# Patient Record
Sex: MALE | Race: BLACK OR AFRICAN AMERICAN | NOT HISPANIC OR LATINO | URBAN - METROPOLITAN AREA
[De-identification: names, ages, dates, MRNs, and addresses within clinical notes are randomized per-mention and may not be internally consistent; named-entity substitution may affect disease eponyms.]

---

## 2016-10-11 RX ORDER — DEXAMETHASONE 0.5 MG/5ML
2 ELIXIR ORAL
Qty: 0 | Refills: 0 | COMMUNITY
Start: 2016-10-11

## 2017-02-10 ENCOUNTER — INPATIENT (INPATIENT)
Facility: HOSPITAL | Age: 72
LOS: 3 days | Discharge: LEFT BEFORE DISCHARGE | DRG: 80 | End: 2017-02-14
Attending: INTERNAL MEDICINE | Admitting: INTERNAL MEDICINE
Payer: MEDICARE

## 2017-02-10 VITALS
HEART RATE: 108 BPM | DIASTOLIC BLOOD PRESSURE: 80 MMHG | RESPIRATION RATE: 20 BRPM | OXYGEN SATURATION: 100 % | SYSTOLIC BLOOD PRESSURE: 138 MMHG

## 2017-02-10 DIAGNOSIS — G93.6 CEREBRAL EDEMA: ICD-10-CM

## 2017-02-10 DIAGNOSIS — C64.9 MALIGNANT NEOPLASM OF UNSPECIFIED KIDNEY, EXCEPT RENAL PELVIS: ICD-10-CM

## 2017-02-10 DIAGNOSIS — E11.9 TYPE 2 DIABETES MELLITUS WITHOUT COMPLICATIONS: ICD-10-CM

## 2017-02-10 DIAGNOSIS — N18.9 CHRONIC KIDNEY DISEASE, UNSPECIFIED: ICD-10-CM

## 2017-02-10 DIAGNOSIS — Z90.5 ACQUIRED ABSENCE OF KIDNEY: Chronic | ICD-10-CM

## 2017-02-10 LAB
ALBUMIN SERPL ELPH-MCNC: 4 G/DL — SIGNIFICANT CHANGE UP (ref 3.3–5)
ALP SERPL-CCNC: 50 U/L — SIGNIFICANT CHANGE UP (ref 40–120)
ALT FLD-CCNC: 31 U/L RC — SIGNIFICANT CHANGE UP (ref 10–45)
ANION GAP SERPL CALC-SCNC: 14 MMOL/L — SIGNIFICANT CHANGE UP (ref 5–17)
ANION GAP SERPL CALC-SCNC: 17 MMOL/L — SIGNIFICANT CHANGE UP (ref 5–17)
APPEARANCE UR: CLEAR — SIGNIFICANT CHANGE UP
APTT BLD: 31.5 SEC — SIGNIFICANT CHANGE UP (ref 27.5–37.4)
AST SERPL-CCNC: 55 U/L — HIGH (ref 10–40)
BASOPHILS # BLD AUTO: 0 K/UL — SIGNIFICANT CHANGE UP (ref 0–0.2)
BASOPHILS NFR BLD AUTO: 0.5 % — SIGNIFICANT CHANGE UP (ref 0–2)
BILIRUB SERPL-MCNC: 0.7 MG/DL — SIGNIFICANT CHANGE UP (ref 0.2–1.2)
BILIRUB UR-MCNC: NEGATIVE — SIGNIFICANT CHANGE UP
BUN SERPL-MCNC: 49 MG/DL — HIGH (ref 7–23)
BUN SERPL-MCNC: 51 MG/DL — HIGH (ref 7–23)
CALCIUM SERPL-MCNC: 9.6 MG/DL — SIGNIFICANT CHANGE UP (ref 8.4–10.5)
CALCIUM SERPL-MCNC: 9.7 MG/DL — SIGNIFICANT CHANGE UP (ref 8.4–10.5)
CHLORIDE SERPL-SCNC: 102 MMOL/L — SIGNIFICANT CHANGE UP (ref 96–108)
CHLORIDE SERPL-SCNC: 103 MMOL/L — SIGNIFICANT CHANGE UP (ref 96–108)
CO2 SERPL-SCNC: 21 MMOL/L — LOW (ref 22–31)
CO2 SERPL-SCNC: 21 MMOL/L — LOW (ref 22–31)
COLOR SPEC: YELLOW — SIGNIFICANT CHANGE UP
CREAT SERPL-MCNC: 2.09 MG/DL — HIGH (ref 0.5–1.3)
CREAT SERPL-MCNC: 2.15 MG/DL — HIGH (ref 0.5–1.3)
DIFF PNL FLD: ABNORMAL
EOSINOPHIL # BLD AUTO: 0.1 K/UL — SIGNIFICANT CHANGE UP (ref 0–0.5)
EOSINOPHIL NFR BLD AUTO: 1.5 % — SIGNIFICANT CHANGE UP (ref 0–6)
EPI CELLS # UR: SIGNIFICANT CHANGE UP /HPF
GAS PNL BLDV: SIGNIFICANT CHANGE UP
GLUCOSE SERPL-MCNC: 179 MG/DL — HIGH (ref 70–99)
GLUCOSE SERPL-MCNC: 313 MG/DL — HIGH (ref 70–99)
GLUCOSE UR QL: NEGATIVE — SIGNIFICANT CHANGE UP
HCT VFR BLD CALC: 38.9 % — LOW (ref 39–50)
HGB BLD-MCNC: 12.9 G/DL — LOW (ref 13–17)
INR BLD: 1.04 RATIO — SIGNIFICANT CHANGE UP (ref 0.88–1.16)
KETONES UR-MCNC: NEGATIVE — SIGNIFICANT CHANGE UP
LEUKOCYTE ESTERASE UR-ACNC: NEGATIVE — SIGNIFICANT CHANGE UP
LYMPHOCYTES # BLD AUTO: 1 K/UL — SIGNIFICANT CHANGE UP (ref 1–3.3)
LYMPHOCYTES # BLD AUTO: 13.8 % — SIGNIFICANT CHANGE UP (ref 13–44)
MAGNESIUM SERPL-MCNC: 2.2 MG/DL — SIGNIFICANT CHANGE UP (ref 1.6–2.6)
MCHC RBC-ENTMCNC: 30.1 PG — SIGNIFICANT CHANGE UP (ref 27–34)
MCHC RBC-ENTMCNC: 33.2 GM/DL — SIGNIFICANT CHANGE UP (ref 32–36)
MCV RBC AUTO: 90.6 FL — SIGNIFICANT CHANGE UP (ref 80–100)
MONOCYTES # BLD AUTO: 0.6 K/UL — SIGNIFICANT CHANGE UP (ref 0–0.9)
MONOCYTES NFR BLD AUTO: 8.5 % — SIGNIFICANT CHANGE UP (ref 2–14)
NEUTROPHILS # BLD AUTO: 5.7 K/UL — SIGNIFICANT CHANGE UP (ref 1.8–7.4)
NEUTROPHILS NFR BLD AUTO: 75.7 % — SIGNIFICANT CHANGE UP (ref 43–77)
NITRITE UR-MCNC: NEGATIVE — SIGNIFICANT CHANGE UP
PH UR: 5.5 — SIGNIFICANT CHANGE UP (ref 4.8–8)
PHOSPHATE SERPL-MCNC: 3.8 MG/DL — SIGNIFICANT CHANGE UP (ref 2.5–4.5)
PLATELET # BLD AUTO: 223 K/UL — SIGNIFICANT CHANGE UP (ref 150–400)
POTASSIUM SERPL-MCNC: 5.3 MMOL/L — SIGNIFICANT CHANGE UP (ref 3.5–5.3)
POTASSIUM SERPL-MCNC: 6.9 MMOL/L — CRITICAL HIGH (ref 3.5–5.3)
POTASSIUM SERPL-SCNC: 5.3 MMOL/L — SIGNIFICANT CHANGE UP (ref 3.5–5.3)
POTASSIUM SERPL-SCNC: 6.9 MMOL/L — CRITICAL HIGH (ref 3.5–5.3)
PROT SERPL-MCNC: 8.3 G/DL — SIGNIFICANT CHANGE UP (ref 6–8.3)
PROT UR-MCNC: 30 MG/DL
PROTHROM AB SERPL-ACNC: 11.3 SEC — SIGNIFICANT CHANGE UP (ref 10–13.1)
RBC # BLD: 4.3 M/UL — SIGNIFICANT CHANGE UP (ref 4.2–5.8)
RBC # FLD: 13.7 % — SIGNIFICANT CHANGE UP (ref 10.3–14.5)
RBC CASTS # UR COMP ASSIST: ABNORMAL /HPF (ref 0–2)
SODIUM SERPL-SCNC: 138 MMOL/L — SIGNIFICANT CHANGE UP (ref 135–145)
SODIUM SERPL-SCNC: 140 MMOL/L — SIGNIFICANT CHANGE UP (ref 135–145)
SP GR SPEC: 1.02 — SIGNIFICANT CHANGE UP (ref 1.01–1.02)
UROBILINOGEN FLD QL: NEGATIVE — SIGNIFICANT CHANGE UP
WBC # BLD: 7.6 K/UL — SIGNIFICANT CHANGE UP (ref 3.8–10.5)
WBC # FLD AUTO: 7.6 K/UL — SIGNIFICANT CHANGE UP (ref 3.8–10.5)
WBC UR QL: SIGNIFICANT CHANGE UP /HPF (ref 0–5)

## 2017-02-10 PROCEDURE — 99285 EMERGENCY DEPT VISIT HI MDM: CPT | Mod: GC

## 2017-02-10 PROCEDURE — 70450 CT HEAD/BRAIN W/O DYE: CPT | Mod: 26

## 2017-02-10 PROCEDURE — 71010: CPT | Mod: 26

## 2017-02-10 RX ORDER — DEXAMETHASONE 0.5 MG/5ML
4 ELIXIR ORAL EVERY 12 HOURS
Qty: 0 | Refills: 0 | Status: DISCONTINUED | OUTPATIENT
Start: 2017-02-11 | End: 2017-02-14

## 2017-02-10 RX ORDER — DEXTROSE 50 % IN WATER 50 %
25 SYRINGE (ML) INTRAVENOUS ONCE
Qty: 0 | Refills: 0 | Status: DISCONTINUED | OUTPATIENT
Start: 2017-02-10 | End: 2017-02-14

## 2017-02-10 RX ORDER — DEXAMETHASONE 0.5 MG/5ML
14 ELIXIR ORAL ONCE
Qty: 0 | Refills: 0 | Status: COMPLETED | OUTPATIENT
Start: 2017-02-10 | End: 2017-02-10

## 2017-02-10 RX ORDER — INSULIN LISPRO 100/ML
VIAL (ML) SUBCUTANEOUS
Qty: 0 | Refills: 0 | Status: DISCONTINUED | OUTPATIENT
Start: 2017-02-10 | End: 2017-02-14

## 2017-02-10 RX ORDER — INSULIN LISPRO 100/ML
VIAL (ML) SUBCUTANEOUS AT BEDTIME
Qty: 0 | Refills: 0 | Status: DISCONTINUED | OUTPATIENT
Start: 2017-02-10 | End: 2017-02-14

## 2017-02-10 RX ORDER — DEXTROSE 50 % IN WATER 50 %
1 SYRINGE (ML) INTRAVENOUS ONCE
Qty: 0 | Refills: 0 | Status: DISCONTINUED | OUTPATIENT
Start: 2017-02-10 | End: 2017-02-14

## 2017-02-10 RX ORDER — GLUCAGON INJECTION, SOLUTION 0.5 MG/.1ML
1 INJECTION, SOLUTION SUBCUTANEOUS ONCE
Qty: 0 | Refills: 0 | Status: DISCONTINUED | OUTPATIENT
Start: 2017-02-10 | End: 2017-02-14

## 2017-02-10 RX ORDER — SODIUM CHLORIDE 9 MG/ML
1000 INJECTION, SOLUTION INTRAVENOUS
Qty: 0 | Refills: 0 | Status: DISCONTINUED | OUTPATIENT
Start: 2017-02-10 | End: 2017-02-14

## 2017-02-10 RX ORDER — DEXTROSE 50 % IN WATER 50 %
12.5 SYRINGE (ML) INTRAVENOUS ONCE
Qty: 0 | Refills: 0 | Status: DISCONTINUED | OUTPATIENT
Start: 2017-02-10 | End: 2017-02-14

## 2017-02-10 RX ORDER — DEXAMETHASONE 0.5 MG/5ML
14 ELIXIR ORAL ONCE
Qty: 0 | Refills: 0 | Status: DISCONTINUED | OUTPATIENT
Start: 2017-02-10 | End: 2017-02-10

## 2017-02-10 RX ADMIN — Medication 107 MILLIGRAM(S): at 20:10

## 2017-02-10 RX ADMIN — Medication 2: at 23:05

## 2017-02-10 NOTE — ED PROVIDER NOTE - OBJECTIVE STATEMENT
71M with DM and Renal Cell CA w/ brain mets, presenting with slurred speech since yesterday. Per report, the home attendant called EMS because the pt had slurred speech, generalized weakness since yesterday. Pt unable to provide history. Will reach out to home health attendant for collateral. 71M with DM and Renal Cell CA w/ brain mets, presenting with slurred speech since yesterday. Per report, the home attendant called EMS because the pt had slurred speech, generalized weakness since yesterday. Pt unable to provide history. Will reach out to home health attendant for collateral.    Addendum: Per home health attendant, the patient had a stroke 6 months ago and has been seeing a speech therapist since then. However, his speech is more garbled than usual and he has been lethargic since yesterday. She said that he was supposed to see his PMD today because of a low INR and that earlier today he had a low blood sugar. She stated that he was previously getting whole brain radiation but no longer is. She also said he fell today, but did not hit his head. However, he hits his head "all the time."

## 2017-02-10 NOTE — ED PROVIDER NOTE - MEDICAL DECISION MAKING DETAILS
Attending MD Zamorano: 71M with h/o renal cell CA with brain mets on decadron and Keppra presenting with several days of worsening speech difficulty and falls, patient globally aphasic on exam, +R facial droop, ddx includes enlarging brain mets, secondary ICH, ischemic CVA, plan for CT head, neuro cs

## 2017-02-10 NOTE — ED ADULT NURSE NOTE - OBJECTIVE STATEMENT
2000 initial hx started now fr shift erin Zuniga/pt was presented w/ slurred speech since yesterday has previous hx craniotomy and renal carcinoma, + indication of cerebral edema per ct scan, pt on co for safety, pt able to verbalize concerns at this time, no distress noted pending bed/gcruz  2119 pt report given to flr[facility]

## 2017-02-10 NOTE — ED ADULT NURSE REASSESSMENT NOTE - NS ED NURSE REASSESS COMMENT FT1
At 1400 home attendant states, No change in slurred speech and expressive aphasia. This is normal for pt. Pt admit Denies any discomfort.

## 2017-02-10 NOTE — ED PROVIDER NOTE - PHYSICAL EXAMINATION
Attending MD Zamorano: awake and alert, globally aphasic, follows commands, +R facial droop, NAD, HEENT WNL and no facial asymmetry; lungs CTAB, heart with reg rhythm without murmur; abdomen soft NTND; extremities with no edema; neuro exam non focal with no motor or sensory deficits.

## 2017-02-10 NOTE — ED PROVIDER NOTE - ATTENDING CONTRIBUTION TO CARE
Attending MD Zamorano:  I personally have seen and examined this patient.  Resident note reviewed and agree on plan of care and except where noted.  See HPI, PE, and MDM for details.

## 2017-02-10 NOTE — ED PROVIDER NOTE - PROGRESS NOTE DETAILS
Spoke with neurology resident, giving 14mg decadron IV and admitting to medicine.  Leora Ruiz MD PGY-1

## 2017-02-10 NOTE — H&P ADULT. - ASSESSMENT
71M with DM and Renal Cell CA w/ brain mets, presenting with slurred speech since yesterday. Per report, the home attendant called EMS because the pt had slurred speech, generalized weakness since yesterday. Pt unable to provide history. Per home health attendant, the patient had a stroke 6 months ago and has been seeing a speech therapist since then. However, his speech is more garbled than usual and he has been lethargic since yesterday. She said that he was supposed to see his PMD today because of a low INR and that earlier today he had a low blood sugar. She stated that he was previously getting whole brain radiation but no longer is. She also said he fell today, but did not hit his head. However, he hits his head "all the time."  History obtained from ER notes, patient not been able to provide any history and home attendant left, could not be reached.  Patient seen by neuro in ER and given dexamethasone.

## 2017-02-10 NOTE — H&P ADULT. - RS GEN PE MLT RESP DETAILS PC
normal/clear to auscultation bilaterally/good air movement/breath sounds equal/respirations non-labored/airway patent

## 2017-02-11 LAB
ANION GAP SERPL CALC-SCNC: 20 MMOL/L — HIGH (ref 5–17)
BUN SERPL-MCNC: 52 MG/DL — HIGH (ref 7–23)
CALCIUM SERPL-MCNC: 10 MG/DL — SIGNIFICANT CHANGE UP (ref 8.4–10.5)
CHLORIDE SERPL-SCNC: 105 MMOL/L — SIGNIFICANT CHANGE UP (ref 96–108)
CO2 SERPL-SCNC: 18 MMOL/L — LOW (ref 22–31)
CREAT SERPL-MCNC: 2.14 MG/DL — HIGH (ref 0.5–1.3)
GLUCOSE SERPL-MCNC: 319 MG/DL — HIGH (ref 70–99)
HBA1C BLD-MCNC: 10.7 % — HIGH (ref 4–5.6)
HCT VFR BLD CALC: 39.2 % — SIGNIFICANT CHANGE UP (ref 39–50)
HGB BLD-MCNC: 12.8 G/DL — LOW (ref 13–17)
MCHC RBC-ENTMCNC: 29.4 PG — SIGNIFICANT CHANGE UP (ref 27–34)
MCHC RBC-ENTMCNC: 32.7 GM/DL — SIGNIFICANT CHANGE UP (ref 32–36)
MCV RBC AUTO: 90.1 FL — SIGNIFICANT CHANGE UP (ref 80–100)
PLATELET # BLD AUTO: 221 K/UL — SIGNIFICANT CHANGE UP (ref 150–400)
POTASSIUM SERPL-MCNC: 4.9 MMOL/L — SIGNIFICANT CHANGE UP (ref 3.5–5.3)
POTASSIUM SERPL-SCNC: 4.9 MMOL/L — SIGNIFICANT CHANGE UP (ref 3.5–5.3)
RBC # BLD: 4.35 M/UL — SIGNIFICANT CHANGE UP (ref 4.2–5.8)
RBC # FLD: 14.3 % — SIGNIFICANT CHANGE UP (ref 10.3–14.5)
SODIUM SERPL-SCNC: 143 MMOL/L — SIGNIFICANT CHANGE UP (ref 135–145)
WBC # BLD: 7.98 K/UL — SIGNIFICANT CHANGE UP (ref 3.8–10.5)
WBC # FLD AUTO: 7.98 K/UL — SIGNIFICANT CHANGE UP (ref 3.8–10.5)

## 2017-02-11 PROCEDURE — 99223 1ST HOSP IP/OBS HIGH 75: CPT

## 2017-02-11 RX ORDER — INSULIN GLARGINE 100 [IU]/ML
10 INJECTION, SOLUTION SUBCUTANEOUS AT BEDTIME
Qty: 0 | Refills: 0 | Status: DISCONTINUED | OUTPATIENT
Start: 2017-02-11 | End: 2017-02-12

## 2017-02-11 RX ORDER — HEPARIN SODIUM 5000 [USP'U]/ML
5000 INJECTION INTRAVENOUS; SUBCUTANEOUS EVERY 12 HOURS
Qty: 0 | Refills: 0 | Status: DISCONTINUED | OUTPATIENT
Start: 2017-02-11 | End: 2017-02-12

## 2017-02-11 RX ORDER — LIRAGLUTIDE 6 MG/ML
0 INJECTION SUBCUTANEOUS
Qty: 0 | Refills: 0 | COMMUNITY

## 2017-02-11 RX ORDER — SODIUM CHLORIDE 9 MG/ML
1000 INJECTION INTRAMUSCULAR; INTRAVENOUS; SUBCUTANEOUS
Qty: 0 | Refills: 0 | Status: DISCONTINUED | OUTPATIENT
Start: 2017-02-11 | End: 2017-02-14

## 2017-02-11 RX ADMIN — HEPARIN SODIUM 5000 UNIT(S): 5000 INJECTION INTRAVENOUS; SUBCUTANEOUS at 17:34

## 2017-02-11 RX ADMIN — Medication 4 MILLIGRAM(S): at 17:34

## 2017-02-11 RX ADMIN — Medication: at 13:53

## 2017-02-11 RX ADMIN — INSULIN GLARGINE 10 UNIT(S): 100 INJECTION, SOLUTION SUBCUTANEOUS at 22:26

## 2017-02-11 RX ADMIN — Medication 4: at 09:34

## 2017-02-11 RX ADMIN — Medication 4 MILLIGRAM(S): at 05:55

## 2017-02-11 RX ADMIN — Medication 5: at 17:34

## 2017-02-11 NOTE — DIETITIAN INITIAL EVALUATION ADULT. - NS AS NUTRI INTERV MEDICAL AND FOOD SUPPLEMENTS
Nutrient intake needs to be increased therefore, recommend Ensure Plus twice daily providing (Kcal: 700/Protein: 26grams)/Commercial beverage Nutrient intake needs to be increased therefore, recommend Ensure Plus twice daily providing (Kcal: 700/Protein: 26grams) if PO diet initiated./Commercial beverage

## 2017-02-11 NOTE — DIETITIAN INITIAL EVALUATION ADULT. - NS AS NUTRI INTERV ED CONTENT
Patient reports being uninterested in diet education at this time. Will attempt to educate upon follow up.

## 2017-02-11 NOTE — DIETITIAN INITIAL EVALUATION ADULT. - OTHER INFO
Visit warranted for consult: A1C: >7%. Patient visited, reports having good oral intake of meals however, per RN patient has been consume 10-13% of all meals. Patient states she has no chewing or swallowing difficulties at this time. Noted not GI distress of nausea/ vomiting. +BM: 2/11. Patient is unable to provided his UBW but states he has lost considerable amounts of weight secondary to having cancer. Visit warranted for consult: A1C: >7%. Patient visited, reports having good oral intake of meals however, per RN patient has been consume 10-13% of all meals. Ensure Plus offered to patient, and he is receptive to trying two Ensure Plus daily. Patient states he has no chewing/ swallowing difficulties at this time. Noted no GI distress of nausea/ vomiting. +BM: 2/11. Patient is unable to provided his UBW but states he has lost considerable amounts of weight secondary to having cancer. NKFA. No micronutrient supplementation PTA . Visit warranted for consult: A1C: >7%. Patient visited, reports having good oral intake of meals however, per RN patient has been consume 10-13% of all meals. Ensure Plus offered to patient, and he is receptive to trying two Ensure Plus daily. Patient states he has no chewing/ swallowing difficulties at this time. Noted no GI distress of nausea/ vomiting. +BM: 2/11. Patient is unable to provided his UBW but states he has lost considerable amounts of weight secondary to having cancer. Per patient he checks his finger sticks twice daily with the reading beient "137mg/dL" a. Patient home meds noted to be: Novolog, Lantus and Coumadin. NKFA. No micronutrient supplementation PTA . Visit warranted for consult: A1C: >7%. Patient visited, reports having good oral intake of meals however, per RN patient has been consume 10-13% of all meals. Ensure Plus offered to patient, and he is receptive to trying two Ensure Plus daily. Patient states he has no chewing/ swallowing difficulties at this time. Noted no GI distress of nausea/ vomiting. +BM: 2/11. Patient is unable to provided his UBW but states he has lost considerable amounts of weight secondary to having cancer. Per patient he checks his finger sticks twice daily with the reading beient "137mg/dL" Patient home meds noted to be: Novolog, Lantus and Coumadin. NKFA. No micronutrient supplementation PTA . Visit warranted for consult: A1C: >7%. Patient visited, reports having good oral intake of meals however, per RN patient has been consume 10-13% of all meals. Ensure Plus offered to patient, and he is receptive to trying two Ensure Plus daily pending SLP evaluation. Patient states he has no chewing/ swallowing difficulties at this time. Noted no GI distress of nausea/ vomiting. +BM: 2/11. Patient is unable to provided his UBW but states he has lost considerable amounts of weight secondary to having cancer. Per previous RD note, his UBW is 188 pounds with a 10 pound weight loss noted on Oct/ 2016 admission. Weight in-house noted to be 206.1 pounds. ? accuracy of weight trends as pt stating weight loss. Per patient he checks his finger sticks twice daily with the reading being "137mg/dL" Patient home meds noted to be: Novolog, Lantus and Coumadin. NKFA. No micronutrient supplementation PTA .

## 2017-02-11 NOTE — DIETITIAN INITIAL EVALUATION ADULT. - SIGNS/SYMPTOMS
Reported significant weight loss, energy intake from diet less than 50%, NPO Reports weight loss PTA, RN reports 10-13% meal intake.

## 2017-02-11 NOTE — DIETITIAN INITIAL EVALUATION ADULT. - NS AS NUTRI INTERV MEALS SNACK
Pending medical course recommend Consistent CHO with evening snacks. Monitor labs/weights. RD to remain available for additional recommendations./General/healthful diet General/healthful diet/If PO diet initiated Recommend Consistent CHO with evening snacks. Monitor labs/weights. RD to remain available for additional recommendations.

## 2017-02-11 NOTE — DIETITIAN INITIAL EVALUATION ADULT. - PERTINENT LABORATORY DATA
Reviewed: (2/11): BUN: 52, Cr: 2.14, Blood Glucose: 319. Hgb A1C: 10.7 Reviewed: (2/11): BUN: 52, Cr: 2.14, Blood Glucose: 319. Hgb A1C: 10.7. ( 2/11) Finger Sticks:319, (2/10): 295

## 2017-02-11 NOTE — DIETITIAN INITIAL EVALUATION ADULT. - PT NOT SOURCE
Per chart patient disoriented and confused at this time. No family at bedside. Per chart review, patient is disoriented and confused at this time. No family at bedside.

## 2017-02-11 NOTE — DIETITIAN INITIAL EVALUATION ADULT. - SOURCE
patient/Comprehensive Chart Review/ RN patient/Comprehensive Chart Review/ RN/ Pervious RD note: 10/2016

## 2017-02-11 NOTE — DIETITIAN INITIAL EVALUATION ADULT. - ENERGY NEEDS
Height unknown, pt unwilling to offer stated weight at this time. Sreekanth approximated at 5 feet, 8 inches. , Weight (Current): 206.2 pounds, IBW:154  pounds +/-10%, %IBW: 133.7 %, BMI:31.3. Patient admitted with cerebral edema with PMH of Renal Cell Carcinoma, CKD, DM. Skin: 2+ Edema noted. No pressure injuries noted at this time. Height noted from previous RD note  5 feet, 8 inches.  Weight (Current): 206.2 pounds, IBW:154  pounds +/-10%, %IBW: 133.7 %, BMI:31.3. Patient admitted with cerebral edema with PMH of metastatic Renal Cell Carcinoma, CKD, DM. Skin: 2+ Edema noted. Right leg wound noted at this time.

## 2017-02-12 LAB
ANION GAP SERPL CALC-SCNC: 17 MMOL/L — SIGNIFICANT CHANGE UP (ref 5–17)
APTT BLD: 29.1 SEC — SIGNIFICANT CHANGE UP (ref 27.5–37.4)
BUN SERPL-MCNC: 66 MG/DL — HIGH (ref 7–23)
CALCIUM SERPL-MCNC: 10 MG/DL — SIGNIFICANT CHANGE UP (ref 8.4–10.5)
CHLORIDE SERPL-SCNC: 106 MMOL/L — SIGNIFICANT CHANGE UP (ref 96–108)
CO2 SERPL-SCNC: 21 MMOL/L — LOW (ref 22–31)
CREAT SERPL-MCNC: 2.48 MG/DL — HIGH (ref 0.5–1.3)
GLUCOSE SERPL-MCNC: 263 MG/DL — HIGH (ref 70–99)
HCT VFR BLD CALC: 37.9 % — LOW (ref 39–50)
HGB BLD-MCNC: 11.9 G/DL — LOW (ref 13–17)
INR BLD: 1.03 RATIO — SIGNIFICANT CHANGE UP (ref 0.88–1.16)
MCHC RBC-ENTMCNC: 28.5 PG — SIGNIFICANT CHANGE UP (ref 27–34)
MCHC RBC-ENTMCNC: 31.4 GM/DL — LOW (ref 32–36)
MCV RBC AUTO: 90.9 FL — SIGNIFICANT CHANGE UP (ref 80–100)
PLATELET # BLD AUTO: 224 K/UL — SIGNIFICANT CHANGE UP (ref 150–400)
POTASSIUM SERPL-MCNC: 4.3 MMOL/L — SIGNIFICANT CHANGE UP (ref 3.5–5.3)
POTASSIUM SERPL-SCNC: 4.3 MMOL/L — SIGNIFICANT CHANGE UP (ref 3.5–5.3)
PROTHROM AB SERPL-ACNC: 11.2 SEC — SIGNIFICANT CHANGE UP (ref 10–13.1)
RBC # BLD: 4.17 M/UL — LOW (ref 4.2–5.8)
RBC # FLD: 14.6 % — HIGH (ref 10.3–14.5)
SODIUM SERPL-SCNC: 144 MMOL/L — SIGNIFICANT CHANGE UP (ref 135–145)
WBC # BLD: 8.58 K/UL — SIGNIFICANT CHANGE UP (ref 3.8–10.5)
WBC # FLD AUTO: 8.58 K/UL — SIGNIFICANT CHANGE UP (ref 3.8–10.5)

## 2017-02-12 PROCEDURE — 93970 EXTREMITY STUDY: CPT | Mod: 26

## 2017-02-12 RX ORDER — WARFARIN SODIUM 2.5 MG/1
5 TABLET ORAL ONCE
Qty: 0 | Refills: 0 | Status: COMPLETED | OUTPATIENT
Start: 2017-02-12 | End: 2017-02-12

## 2017-02-12 RX ORDER — INSULIN GLARGINE 100 [IU]/ML
25 INJECTION, SOLUTION SUBCUTANEOUS AT BEDTIME
Qty: 0 | Refills: 0 | Status: DISCONTINUED | OUTPATIENT
Start: 2017-02-12 | End: 2017-02-12

## 2017-02-12 RX ORDER — INSULIN GLARGINE 100 [IU]/ML
15 INJECTION, SOLUTION SUBCUTANEOUS AT BEDTIME
Qty: 0 | Refills: 0 | Status: DISCONTINUED | OUTPATIENT
Start: 2017-02-12 | End: 2017-02-12

## 2017-02-12 RX ORDER — HEPARIN SODIUM 5000 [USP'U]/ML
7500 INJECTION INTRAVENOUS; SUBCUTANEOUS ONCE
Qty: 0 | Refills: 0 | Status: COMPLETED | OUTPATIENT
Start: 2017-02-12 | End: 2017-02-12

## 2017-02-12 RX ORDER — INSULIN LISPRO 100/ML
8 VIAL (ML) SUBCUTANEOUS
Qty: 0 | Refills: 0 | Status: DISCONTINUED | OUTPATIENT
Start: 2017-02-12 | End: 2017-02-12

## 2017-02-12 RX ORDER — HEPARIN SODIUM 5000 [USP'U]/ML
3500 INJECTION INTRAVENOUS; SUBCUTANEOUS EVERY 6 HOURS
Qty: 0 | Refills: 0 | Status: DISCONTINUED | OUTPATIENT
Start: 2017-02-12 | End: 2017-02-14

## 2017-02-12 RX ORDER — INSULIN LISPRO 100/ML
4 VIAL (ML) SUBCUTANEOUS ONCE
Qty: 0 | Refills: 0 | Status: COMPLETED | OUTPATIENT
Start: 2017-02-12 | End: 2017-02-12

## 2017-02-12 RX ORDER — ACETAMINOPHEN 500 MG
650 TABLET ORAL ONCE
Qty: 0 | Refills: 0 | Status: COMPLETED | OUTPATIENT
Start: 2017-02-12 | End: 2017-02-12

## 2017-02-12 RX ORDER — HEPARIN SODIUM 5000 [USP'U]/ML
7500 INJECTION INTRAVENOUS; SUBCUTANEOUS EVERY 6 HOURS
Qty: 0 | Refills: 0 | Status: DISCONTINUED | OUTPATIENT
Start: 2017-02-12 | End: 2017-02-14

## 2017-02-12 RX ORDER — HEPARIN SODIUM 5000 [USP'U]/ML
INJECTION INTRAVENOUS; SUBCUTANEOUS
Qty: 25000 | Refills: 0 | Status: DISCONTINUED | OUTPATIENT
Start: 2017-02-12 | End: 2017-02-14

## 2017-02-12 RX ORDER — HUMAN INSULIN 100 [IU]/ML
8 INJECTION, SUSPENSION SUBCUTANEOUS EVERY 8 HOURS
Qty: 0 | Refills: 0 | Status: DISCONTINUED | OUTPATIENT
Start: 2017-02-12 | End: 2017-02-13

## 2017-02-12 RX ADMIN — HUMAN INSULIN 8 UNIT(S): 100 INJECTION, SUSPENSION SUBCUTANEOUS at 23:24

## 2017-02-12 RX ADMIN — Medication 3: at 08:43

## 2017-02-12 RX ADMIN — HEPARIN SODIUM 1700 UNIT(S)/HR: 5000 INJECTION INTRAVENOUS; SUBCUTANEOUS at 19:01

## 2017-02-12 RX ADMIN — Medication 2: at 23:25

## 2017-02-12 RX ADMIN — Medication 4 UNIT(S): at 13:25

## 2017-02-12 RX ADMIN — SODIUM CHLORIDE 60 MILLILITER(S): 9 INJECTION INTRAMUSCULAR; INTRAVENOUS; SUBCUTANEOUS at 19:30

## 2017-02-12 RX ADMIN — HEPARIN SODIUM 5000 UNIT(S): 5000 INJECTION INTRAVENOUS; SUBCUTANEOUS at 06:13

## 2017-02-12 RX ADMIN — Medication 650 MILLIGRAM(S): at 23:54

## 2017-02-12 RX ADMIN — Medication 2: at 18:09

## 2017-02-12 RX ADMIN — WARFARIN SODIUM 5 MILLIGRAM(S): 2.5 TABLET ORAL at 21:20

## 2017-02-12 RX ADMIN — HEPARIN SODIUM 7500 UNIT(S): 5000 INJECTION INTRAVENOUS; SUBCUTANEOUS at 18:52

## 2017-02-12 RX ADMIN — Medication 260 MILLIGRAM(S): at 23:24

## 2017-02-12 RX ADMIN — Medication 4 MILLIGRAM(S): at 06:14

## 2017-02-12 RX ADMIN — Medication 4 MILLIGRAM(S): at 18:08

## 2017-02-12 RX ADMIN — Medication 6: at 11:52

## 2017-02-12 NOTE — SWALLOW BEDSIDE ASSESSMENT ADULT - ASR SWALLOW RECOMMEND DIAG
MD, PLEASE ENTER ORDER FOR MODIFIED BARIUM SWALLOW STUDY (ENTER UNDER RADIOLOGY EXAMS THE FOLLOWING WAY: GO TO THE BROWSER AND TYPE IN XRAY, THEN HIT THE SPACEBAR, AND TYPE IN THE LETTER M.)  WILL SCHEDULE EXAM UPON RECEIPT IN RADIOLOGY./VFSS/MBS

## 2017-02-12 NOTE — SWALLOW BEDSIDE ASSESSMENT ADULT - COMMENTS
History obtained from ER notes, patient not been able to provide any history and home attendant left, could not be reached. Patient seen by neuro in ER and given dexamethasone. Neuro exam revealed: could not be assessed, awake, confused, moving all extremities. HC: Neuro consulted in ED on 2/10 for weakness. Found pt with significant non-fluent aphasia. CTH showed: There is significant vasogenic edema in the left frontal lobe   subjacent to a left frontal craniotomy defect suggesting either progression of neoplasm or post therapy necrosis, which has developed   since 11/23/2016. Recommend decadron, MRI brain, and oncology consult. CXR for AMS showed: The heart is enlarged. The lungs are clear. Mild degenerative changes of the thoracic spine.

## 2017-02-12 NOTE — SWALLOW BEDSIDE ASSESSMENT ADULT - SWALLOW EVAL: DIAGNOSIS
Pt presents with 1. an oral and suspected pharyngeal dysphagia marked by delayed oral transit time, suspected uncontrolled loss, delayed pharyngeal swallow, reduced hyolaryngeal excursion, subtle wet vocal quality and cough post PO intake of honey thick liquids suggestive of laryngeal penetration/aspiration. 2. Receptive and expressive aphasia.

## 2017-02-12 NOTE — SWALLOW BEDSIDE ASSESSMENT ADULT - SLP PERTINENT HISTORY OF CURRENT PROBLEM
71M with DM and Renal Cell CA w/ brain mets, presenting with slurred speech since yesterday. Per report, the home attendant called EMS because the pt had slurred speech, generalized weakness since yesterday. Pt unable to provide history. Per home health attendant, the patient had a stroke 6 months ago and has been seeing a speech therapist since then. However, his speech is more garbled than usual and he has been lethargic since yesterday. She said that he was supposed to see his PMD today because of a low INR and that earlier today he had a low blood sugar. She stated that he was previously getting whole brain radiation but no longer is. She also said he fell today, but did not hit his head. However, he hits his head "all the time."

## 2017-02-12 NOTE — PROVIDER CONTACT NOTE (OTHER) - ACTION/TREATMENT ORDERED:
continue NPO status pending MBS in am; continue IVF; aspiration precautions; pending wound consult; podiatry/vascular consult in am? Will continue to monitor.

## 2017-02-12 NOTE — SWALLOW BEDSIDE ASSESSMENT ADULT - NS SPL SWALLOW CLINIC TRIAL FT
baseline wet gurgly vocal quality makes subjective analysis of swallow function difficult to a degree however as trials progressed vocal quality improved.

## 2017-02-12 NOTE — SWALLOW BEDSIDE ASSESSMENT ADULT - MODE OF PRESENTATION
spoon/~ 4 ounces of apple sauce and ~ 3 ounces of ensure pudding/fed by clinician cup/self fed/~ 3 ounces apple juice

## 2017-02-12 NOTE — SWALLOW BEDSIDE ASSESSMENT ADULT - PHARYNGEAL PHASE
Decreased laryngeal elevation/Delayed pharyngeal swallow subtle increase in wet vocal quality/Delayed pharyngeal swallow/Decreased laryngeal elevation/Cough post oral intake

## 2017-02-12 NOTE — SWALLOW BEDSIDE ASSESSMENT ADULT - SLP GENERAL OBSERVATIONS
Pt awake sitting on edge of bed, finished breakfast. Pt oriented x1 (name only). + Garbled speech, unable to communicate needs and limited ability to follow simple directions for exam. + Aphasia. + Baseline wet gurgly vocal quality (? if above related to recent PO intake prior to evaluation).

## 2017-02-13 ENCOUNTER — TRANSCRIPTION ENCOUNTER (OUTPATIENT)
Age: 72
End: 2017-02-13

## 2017-02-13 VITALS
TEMPERATURE: 98 F | HEART RATE: 84 BPM | OXYGEN SATURATION: 97 % | SYSTOLIC BLOOD PRESSURE: 138 MMHG | RESPIRATION RATE: 18 BRPM | DIASTOLIC BLOOD PRESSURE: 86 MMHG

## 2017-02-13 LAB
ANION GAP SERPL CALC-SCNC: 14 MMOL/L — SIGNIFICANT CHANGE UP (ref 5–17)
APTT BLD: 102.6 SEC — HIGH (ref 27.5–37.4)
APTT BLD: > 200 SEC (ref 27.5–37.4)
APTT BLD: >200 SEC — CRITICAL HIGH (ref 27.5–37.4)
BUN SERPL-MCNC: 51 MG/DL — HIGH (ref 7–23)
CALCIUM SERPL-MCNC: 9.7 MG/DL — SIGNIFICANT CHANGE UP (ref 8.4–10.5)
CHLORIDE SERPL-SCNC: 110 MMOL/L — HIGH (ref 96–108)
CO2 SERPL-SCNC: 20 MMOL/L — LOW (ref 22–31)
CREAT SERPL-MCNC: 2.21 MG/DL — HIGH (ref 0.5–1.3)
GLUCOSE SERPL-MCNC: 277 MG/DL — HIGH (ref 70–99)
HCT VFR BLD CALC: 36.8 % — LOW (ref 39–50)
HCT VFR BLD CALC: 38.9 % — LOW (ref 39–50)
HGB BLD-MCNC: 12.6 G/DL — LOW (ref 13–17)
HGB BLD-MCNC: 12.7 G/DL — LOW (ref 13–17)
INR BLD: 1.09 RATIO — SIGNIFICANT CHANGE UP (ref 0.88–1.16)
MCHC RBC-ENTMCNC: 29.3 PG — SIGNIFICANT CHANGE UP (ref 27–34)
MCHC RBC-ENTMCNC: 30.8 PG — SIGNIFICANT CHANGE UP (ref 27–34)
MCHC RBC-ENTMCNC: 32.6 GM/DL — SIGNIFICANT CHANGE UP (ref 32–36)
MCHC RBC-ENTMCNC: 34.2 GM/DL — SIGNIFICANT CHANGE UP (ref 32–36)
MCV RBC AUTO: 89.6 FL — SIGNIFICANT CHANGE UP (ref 80–100)
MCV RBC AUTO: 90.1 FL — SIGNIFICANT CHANGE UP (ref 80–100)
PLATELET # BLD AUTO: 167 K/UL — SIGNIFICANT CHANGE UP (ref 150–400)
PLATELET # BLD AUTO: 193 K/UL — SIGNIFICANT CHANGE UP (ref 150–400)
POTASSIUM SERPL-MCNC: 4.6 MMOL/L — SIGNIFICANT CHANGE UP (ref 3.5–5.3)
POTASSIUM SERPL-SCNC: 4.6 MMOL/L — SIGNIFICANT CHANGE UP (ref 3.5–5.3)
PROTHROM AB SERPL-ACNC: 12.3 SEC — SIGNIFICANT CHANGE UP (ref 10–13.1)
RBC # BLD: 4.08 M/UL — LOW (ref 4.2–5.8)
RBC # BLD: 4.34 M/UL — SIGNIFICANT CHANGE UP (ref 4.2–5.8)
RBC # FLD: 13.7 % — SIGNIFICANT CHANGE UP (ref 10.3–14.5)
RBC # FLD: 14.3 % — SIGNIFICANT CHANGE UP (ref 10.3–14.5)
SODIUM SERPL-SCNC: 144 MMOL/L — SIGNIFICANT CHANGE UP (ref 135–145)
WBC # BLD: 7.2 K/UL — SIGNIFICANT CHANGE UP (ref 3.8–10.5)
WBC # BLD: 7.36 K/UL — SIGNIFICANT CHANGE UP (ref 3.8–10.5)
WBC # FLD AUTO: 7.2 K/UL — SIGNIFICANT CHANGE UP (ref 3.8–10.5)
WBC # FLD AUTO: 7.36 K/UL — SIGNIFICANT CHANGE UP (ref 3.8–10.5)

## 2017-02-13 PROCEDURE — 82435 ASSAY OF BLOOD CHLORIDE: CPT

## 2017-02-13 PROCEDURE — 83735 ASSAY OF MAGNESIUM: CPT

## 2017-02-13 PROCEDURE — 74230 X-RAY XM SWLNG FUNCJ C+: CPT | Mod: 26

## 2017-02-13 PROCEDURE — 82962 GLUCOSE BLOOD TEST: CPT

## 2017-02-13 PROCEDURE — 84100 ASSAY OF PHOSPHORUS: CPT

## 2017-02-13 PROCEDURE — 92610 EVALUATE SWALLOWING FUNCTION: CPT

## 2017-02-13 PROCEDURE — 82803 BLOOD GASES ANY COMBINATION: CPT

## 2017-02-13 PROCEDURE — 73590 X-RAY EXAM OF LOWER LEG: CPT | Mod: 26,RT

## 2017-02-13 PROCEDURE — 83605 ASSAY OF LACTIC ACID: CPT

## 2017-02-13 PROCEDURE — 82330 ASSAY OF CALCIUM: CPT

## 2017-02-13 PROCEDURE — 84132 ASSAY OF SERUM POTASSIUM: CPT

## 2017-02-13 PROCEDURE — 82947 ASSAY GLUCOSE BLOOD QUANT: CPT

## 2017-02-13 PROCEDURE — 99222 1ST HOSP IP/OBS MODERATE 55: CPT

## 2017-02-13 PROCEDURE — 99232 SBSQ HOSP IP/OBS MODERATE 35: CPT

## 2017-02-13 PROCEDURE — 70450 CT HEAD/BRAIN W/O DYE: CPT

## 2017-02-13 PROCEDURE — 99285 EMERGENCY DEPT VISIT HI MDM: CPT | Mod: 25

## 2017-02-13 PROCEDURE — 93970 EXTREMITY STUDY: CPT

## 2017-02-13 PROCEDURE — 85027 COMPLETE CBC AUTOMATED: CPT

## 2017-02-13 PROCEDURE — 80048 BASIC METABOLIC PNL TOTAL CA: CPT

## 2017-02-13 PROCEDURE — 73590 X-RAY EXAM OF LOWER LEG: CPT

## 2017-02-13 PROCEDURE — 82565 ASSAY OF CREATININE: CPT

## 2017-02-13 PROCEDURE — 85014 HEMATOCRIT: CPT

## 2017-02-13 PROCEDURE — 93923 UPR/LXTR ART STDY 3+ LVLS: CPT

## 2017-02-13 PROCEDURE — 80053 COMPREHEN METABOLIC PANEL: CPT

## 2017-02-13 PROCEDURE — 85730 THROMBOPLASTIN TIME PARTIAL: CPT

## 2017-02-13 PROCEDURE — 83036 HEMOGLOBIN GLYCOSYLATED A1C: CPT

## 2017-02-13 PROCEDURE — 84295 ASSAY OF SERUM SODIUM: CPT

## 2017-02-13 PROCEDURE — 81001 URINALYSIS AUTO W/SCOPE: CPT

## 2017-02-13 PROCEDURE — 74230 X-RAY XM SWLNG FUNCJ C+: CPT

## 2017-02-13 PROCEDURE — 71045 X-RAY EXAM CHEST 1 VIEW: CPT

## 2017-02-13 PROCEDURE — 85610 PROTHROMBIN TIME: CPT

## 2017-02-13 PROCEDURE — 93005 ELECTROCARDIOGRAM TRACING: CPT

## 2017-02-13 PROCEDURE — 92611 MOTION FLUOROSCOPY/SWALLOW: CPT

## 2017-02-13 RX ORDER — HUMAN INSULIN 100 [IU]/ML
10 INJECTION, SUSPENSION SUBCUTANEOUS EVERY 8 HOURS
Qty: 0 | Refills: 0 | Status: DISCONTINUED | OUTPATIENT
Start: 2017-02-13 | End: 2017-02-14

## 2017-02-13 RX ORDER — WARFARIN SODIUM 2.5 MG/1
10 TABLET ORAL ONCE
Qty: 0 | Refills: 0 | Status: COMPLETED | OUTPATIENT
Start: 2017-02-13 | End: 2017-02-13

## 2017-02-13 RX ORDER — LEVETIRACETAM 250 MG/1
750 TABLET, FILM COATED ORAL
Qty: 0 | Refills: 0 | Status: DISCONTINUED | OUTPATIENT
Start: 2017-02-13 | End: 2017-02-14

## 2017-02-13 RX ORDER — HUMAN INSULIN 100 [IU]/ML
10 INJECTION, SUSPENSION SUBCUTANEOUS
Qty: 0 | Refills: 0 | COMMUNITY
Start: 2017-02-13

## 2017-02-13 RX ORDER — LEVETIRACETAM 250 MG/1
1 TABLET, FILM COATED ORAL
Qty: 0 | Refills: 0 | COMMUNITY
Start: 2017-02-13

## 2017-02-13 RX ADMIN — HEPARIN SODIUM 0 UNIT(S)/HR: 5000 INJECTION INTRAVENOUS; SUBCUTANEOUS at 01:55

## 2017-02-13 RX ADMIN — LEVETIRACETAM 750 MILLIGRAM(S): 250 TABLET, FILM COATED ORAL at 17:47

## 2017-02-13 RX ADMIN — HEPARIN SODIUM 900 UNIT(S)/HR: 5000 INJECTION INTRAVENOUS; SUBCUTANEOUS at 18:58

## 2017-02-13 RX ADMIN — HEPARIN SODIUM 0 UNIT(S)/HR: 5000 INJECTION INTRAVENOUS; SUBCUTANEOUS at 11:14

## 2017-02-13 RX ADMIN — Medication 4 MILLIGRAM(S): at 05:15

## 2017-02-13 RX ADMIN — Medication 2: at 17:44

## 2017-02-13 RX ADMIN — Medication 3: at 11:58

## 2017-02-13 RX ADMIN — HUMAN INSULIN 8 UNIT(S): 100 INJECTION, SUSPENSION SUBCUTANEOUS at 06:24

## 2017-02-13 RX ADMIN — HUMAN INSULIN 10 UNIT(S): 100 INJECTION, SUSPENSION SUBCUTANEOUS at 23:02

## 2017-02-13 RX ADMIN — HEPARIN SODIUM 1100 UNIT(S)/HR: 5000 INJECTION INTRAVENOUS; SUBCUTANEOUS at 12:17

## 2017-02-13 RX ADMIN — SODIUM CHLORIDE 60 MILLILITER(S): 9 INJECTION INTRAMUSCULAR; INTRAVENOUS; SUBCUTANEOUS at 05:15

## 2017-02-13 RX ADMIN — HEPARIN SODIUM 1400 UNIT(S)/HR: 5000 INJECTION INTRAVENOUS; SUBCUTANEOUS at 02:57

## 2017-02-13 RX ADMIN — SODIUM CHLORIDE 60 MILLILITER(S): 9 INJECTION INTRAMUSCULAR; INTRAVENOUS; SUBCUTANEOUS at 07:36

## 2017-02-13 RX ADMIN — HUMAN INSULIN 10 UNIT(S): 100 INJECTION, SUSPENSION SUBCUTANEOUS at 13:09

## 2017-02-13 RX ADMIN — Medication 4 MILLIGRAM(S): at 17:00

## 2017-02-13 RX ADMIN — WARFARIN SODIUM 10 MILLIGRAM(S): 2.5 TABLET ORAL at 23:02

## 2017-02-13 RX ADMIN — SODIUM CHLORIDE 60 MILLILITER(S): 9 INJECTION INTRAMUSCULAR; INTRAVENOUS; SUBCUTANEOUS at 17:04

## 2017-02-13 NOTE — DISCHARGE NOTE ADULT - NS AS DC STROKE ED MATERIALS
Prescribed Medications/Stroke Warning Signs and Symptoms/Call 911 for Stroke/Stroke Education Booklet/Need for Followup After Discharge/Risk Factors for Stroke

## 2017-02-13 NOTE — PROVIDER CONTACT NOTE (CRITICAL VALUE NOTIFICATION) - ACTION/TREATMENT ORDERED:
Held heparin for 1hr and restarted at 1220 at 11cc/hr. Bleeding precaution maintained. aptt to recheck in 6 hrs.

## 2017-02-13 NOTE — DISCHARGE NOTE ADULT - NS AS DC FOLLOWUP STROKE INST
Coumadin/Warfarin/Stroke (includes: TIA/SAH/ICH/Ischemic Stroke)/Influenza vaccination (VIS Pub Date: August 19, 2014)/Smoking Cessation Influenza vaccination (VIS Pub Date: August 19, 2014)/Coumadin/Warfarin/Smoking Cessation Coumadin/Warfarin

## 2017-02-13 NOTE — SWALLOW VFSS/MBS ASSESSMENT ADULT - ORAL PHASE
Delayed oral transit time/Residue in oral cavity/Reduced anterior - posterior transport/Incomplete tongue to palate contact/mild-moderate spillover of material to the valleculae. oral cavity residue (mild-moderate)/Uncontrolled bolus / spillover in dong-pharynx Delayed oral transit time/Reduced anterior - posterior transport/Incomplete tongue to palate contact/Uncontrolled bolus / spillover in dong-pharynx/moderate -max spillover of material to the valleculae. Trace-mild oral cavity residue/Residue in oral cavity Uncontrolled bolus / spillover in hypopharynx/Incomplete tongue to palate contact/moderate spillover of material to the pyriform sinus/Uncontrolled bolus / spillover in dong-pharynx Uncontrolled bolus / spillover in dong-pharynx/Uncontrolled bolus / spillover in hypopharynx/moderate spillover of material to the pyriform sinus/Incomplete tongue to palate contact Uncontrolled bolus / spillover in dong-pharynx/moderate-max spillover of material to the pyriform sinus/Incomplete tongue to palate contact/Uncontrolled bolus / spillover in hypopharynx

## 2017-02-13 NOTE — DISCHARGE NOTE ADULT - INSTRUCTIONS
Sign AMA Sign AMA. Patient PMH of  Renal cell Carcinoma s/p resection with brain mets with residual aphasia, admitted with lethargy weakness worsening aphaia and worsening cerebral edema on CT

## 2017-02-13 NOTE — SWALLOW VFSS/MBS ASSESSMENT ADULT - RECOMMENDED FEEDING/EATING TECHNIQUES
no straws/oral hygiene/small sips/bites/crush medication (when feasible)/position upright (90 degrees)/maintain upright posture during/after eating for 30 mins

## 2017-02-13 NOTE — SWALLOW VFSS/MBS ASSESSMENT ADULT - LARYNGEAL PENETRATION DURING THE SWALLOW - SILENT
Trace/shallow with retrieval of material deep in laryngeal vestibule/Trace Trace/Mild/deep in the laryngeal vestibule

## 2017-02-13 NOTE — DISCHARGE NOTE ADULT - PATIENT PORTAL LINK FT
“You can access the Follow[facility] Patient Portal, offered by [facility], by registering with the following website: [email or web address]”

## 2017-02-13 NOTE — DISCHARGE NOTE ADULT - ADDITIONAL INSTRUCTIONS
Patient's daughter who is the HP is requesting to leave against Medical Advise. Thorough discussion regarding risks of leaving now without completing treatment  may result in permanent injury up to and including death. Patient's daughter Norma Long verbalized understanding and still request on leaving.

## 2017-02-13 NOTE — SWALLOW VFSS/MBS ASSESSMENT ADULT - RECOMMENDED CONSISTENCY
Dysphagia 1 and honey thick liquids. Assistance with all PO intake. No straws, small bites of food ans small sips of liquid.

## 2017-02-13 NOTE — DISCHARGE NOTE ADULT - CARE PLAN
Principal Discharge DX:	Cerebral edema  Goal:	Patient will remain free of brain swelling  Instructions for follow-up, activity and diet:	Resume diet as tolerated shant gamez Principal Discharge DX:	Renal cell carcinoma, unspecified laterality  Goal:	Patient will remain free of brain swelling  Instructions for follow-up, activity and diet:	Resume diet as tolerated dysphagia puree  Secondary Diagnosis:	DVT (deep venous thrombosis)  Goal:	Continue Coumadin

## 2017-02-13 NOTE — SWALLOW VFSS/MBS ASSESSMENT ADULT - SLP GENERAL OBSERVATIONS
Arrived in radiology secure in AMANUEL chair. + Dysarthria. + IV. Accompanied by PCA. Cooperative and following simple directions for evaluation purposes with assist.

## 2017-02-13 NOTE — SWALLOW VFSS/MBS ASSESSMENT ADULT - ROSENBEK'S PENETRATION ASPIRATION SCALE
(2) contrast enters airway, remains above the vocal cords, no residue remains (penetration) (1) no aspiration, contrast does not enter airway
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
## 2017-02-13 NOTE — DISCHARGE NOTE ADULT - PLAN OF CARE
Patient will remain free of brain swelling Resume diet as tolerated dysphagia puree Continue Coumadin

## 2017-02-13 NOTE — SWALLOW VFSS/MBS ASSESSMENT ADULT - NS SWALLOW VFSS REC ASPIR MON
pneumonia/throat clearing/cough/gurgly voice/change of breathing pattern/upper respiratory infection/Monitor for s/s aspiration/laryngeal penetration. If noted:  D/C p.o. intake, provide non-oral nutrition/hydration/meds, and contact this service @ [phone]/fever

## 2017-02-13 NOTE — DISCHARGE NOTE ADULT - MEDICATION SUMMARY - MEDICATIONS TO TAKE
I will START or STAY ON the medications listed below when I get home from the hospital:    dexamethasone 2 mg oral tablet  -- 2 tab(s) by mouth 2 times a day  -- Indication: For Cerebral edema    Coumadin 5 mg oral tablet  -- 1 tab(s) by mouth once a day (at bedtime) on M-W-Fr  -- Indication: For DVT (deep venous thrombosis)    Coumadin 10 mg oral tablet  -- 1 tab(s) by mouth once a day on S-Tu-Th-Sa  -- Indication: For DVT (deep venous thrombosis)    levETIRAcetam 750 mg oral tablet  -- 1 tab(s) by mouth 2 times a day  -- Indication: For seizures    insulin isophane (NPH) 100 units/mL human recombinant subcutaneous suspension  -- 10 unit(s) subcutaneous every 8 hours  -- Indication: For DM (diabetes mellitus)    simvastatin 20 mg oral tablet  -- 1 tab(s) by mouth once a day (at bedtime)  -- Indication: For Cholesterol    Pepcid 20 mg oral tablet  -- 1 tab(s) by mouth once a day  -- Indication: For gi    oxybutynin 5 mg/24 hours oral tablet, extended release  -- 1 tab(s) by mouth once a day  -- Indication: For pain

## 2017-02-13 NOTE — SWALLOW VFSS/MBS ASSESSMENT ADULT - THE ABOVE FINDINGS WERE DISCUSSED WITH
patient, Daughter -> Norma Long via phone [phone], LES Rae,  Ector patient, Daughter -> Norma Long via phone [phone], LES Rae ( Patient with DM. RN and patient made aware of po trials provided during bedside swallow evaluation. Please review full report for specific items provided. )  Ector

## 2017-02-13 NOTE — SWALLOW VFSS/MBS ASSESSMENT ADULT - ADDITIONAL RECOMMENDATIONS
Patient will benefit from st services post discharge re: restorative swallow therapy to improve function of the swallow mechanism and therapy  to improve speech intelligibility

## 2017-05-03 ENCOUNTER — EMERGENCY (EMERGENCY)
Facility: HOSPITAL | Age: 72
LOS: 1 days | Discharge: HOME | End: 2017-05-03
Attending: EMERGENCY MEDICINE | Admitting: EMERGENCY MEDICINE
Payer: MEDICARE

## 2017-05-03 VITALS
DIASTOLIC BLOOD PRESSURE: 97 MMHG | OXYGEN SATURATION: 99 % | RESPIRATION RATE: 20 BRPM | HEART RATE: 107 BPM | TEMPERATURE: 98 F | SYSTOLIC BLOOD PRESSURE: 143 MMHG

## 2017-05-03 VITALS
RESPIRATION RATE: 20 BRPM | SYSTOLIC BLOOD PRESSURE: 140 MMHG | HEART RATE: 96 BPM | OXYGEN SATURATION: 97 % | DIASTOLIC BLOOD PRESSURE: 90 MMHG

## 2017-05-03 DIAGNOSIS — Z71.1 PERSON WITH FEARED HEALTH COMPLAINT IN WHOM NO DIAGNOSIS IS MADE: ICD-10-CM

## 2017-05-03 DIAGNOSIS — Z90.5 ACQUIRED ABSENCE OF KIDNEY: Chronic | ICD-10-CM

## 2017-05-03 PROCEDURE — 99283 EMERGENCY DEPT VISIT LOW MDM: CPT
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
## 2017-05-03 NOTE — ED ADULT NURSE NOTE - CHPI ED SYMPTOMS NEG
no dizziness/no nausea/no chills/no vomiting/no tingling/no fever/no numbness/no decreased eating/drinking/no pain/no weakness
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
## 2018-05-16 NOTE — H&P ADULT. - SKIN/BREAST
not applicable Quality 130: Documentation Of Current Medications In The Medical Record: Current Medications Documented Quality 111:Pneumonia Vaccination Status For Older Adults: Pneumococcal Vaccination not Administered or Previously Received, Reason not Otherwise Specified Quality 265: Biopsy Follow-Up: Biopsy results reviewed, communicated, tracked, and documented Additional Notes: Patient can follow up with Gp for BMI Quality 226: Preventive Care And Screening: Tobacco Use: Screening And Cessation Intervention: Patient screened for tobacco and never smoked Quality 110: Preventive Care And Screening: Influenza Immunization: Influenza Immunization not Administered for Documented Reasons. Quality 128: Preventive Care And Screening: Body Mass Index (Bmi) Screening And Follow-Up Plan: BMI is documented above normal parameters and a follow-up plan is documented Detail Level: Generalized Quality 431: Preventive Care And Screening: Unhealthy Alcohol Use - Screening: Patient screened for unhealthy alcohol use using a single question and scores less than 2 times per year

## 2021-02-24 NOTE — ED ADULT NURSE NOTE - NS ED NURSE LEVEL OF CONSCIOUSNESS MENTAL STATUS
Anesthesia Evaluation     Patient summary reviewed   NPO Solid Status: > 8 hours             Airway   Mallampati: II  Neck ROM: full  No difficulty expected  Dental    (+) edentulous    Pulmonary    Cardiovascular     ECG reviewed  Rhythm: regular    (+) hypertension,       Neuro/Psych  GI/Hepatic/Renal/Endo      Musculoskeletal     Abdominal    Substance History      OB/GYN          Other   arthritis,        Other Comment: Hb 11.8                  Anesthesia Plan    ASA 3     spinal       Anesthetic plan, all risks, benefits, and alternatives have been provided, discussed and informed consent has been obtained with: patient.  Use of blood products discussed with patient .     
Awake

## 2023-12-30 NOTE — ED PROVIDER NOTE - NSCAREINITIATED _GEN_ER
[facility] dempsey [facility] Center[facility] Morrison  2-1-1:   Enfòmasyrios hayward  [phone] o 2-1-1  [email or web address]    Brandon Metcalf:  koupon beata wander, belle bucio, gadri sibvansyone, PRC  [phone]    [facility]:   wander, èd ak lwaye, rad ak b  [phone] or [phone]    Pura zaman:   wander, èd ak lwaye, rad ak b  [phone]    [facility] ak [address]:     [facility]  [address] [place] [address]  [phone]     annabelle metcalf yo  [address] [place] [address]  [phone]     [facility]:   [facility], klinik sante granmoluci caldera dapre revni ou  [address] [place], [place]   [phone] o [phone]    Huseyinrizwan arcos [facility]:  asistans beata [facility]: [phone]  [email or web address]: asistans beata [facility]   [facility]: [facility] r[place], Formerly [facility] Hospitalheather caldera selon revni ou  [address]   [place] [address]  [phone]    [employer] [place]:  Nitrisyon beata tibebe ak mingo nunezwa selon revni  [address], [place] [id number] [phone]    [facility]:  ofri sèvis otobis nan [facility]. ADA ofri sèvis pòt an pòt beata moun ki gen andikap  [phone]    Hansel yon vwayaj:    [phone]    [facility]: transpò beata granmoun amandeep yo  [phone]          
Leora Ruiz(Resident)

## 2024-12-05 NOTE — DIETITIAN INITIAL EVALUATION ADULT. - ORAL INTAKE PTA
Unable to assess. Patient disinterested in interview upon visit.  Limited and nutrition and weight history obtained. Unable to assess. Patient disinterested in interview upon visit. Limited nutrition and weight history obtained. Unable to assess. Patient disinterested in interview upon visit. Limited information obtained. Medications